# Patient Record
Sex: MALE | Race: WHITE | NOT HISPANIC OR LATINO | URBAN - METROPOLITAN AREA
[De-identification: names, ages, dates, MRNs, and addresses within clinical notes are randomized per-mention and may not be internally consistent; named-entity substitution may affect disease eponyms.]

---

## 2017-02-10 RX ORDER — LEVETIRACETAM 750 MG/1
TABLET ORAL
Qty: 270 TABLET | Refills: 0 | Status: SHIPPED | OUTPATIENT
Start: 2017-02-10

## 2017-02-10 NOTE — TELEPHONE ENCOUNTER
This patient continues to miss appointments but asked for refills.  A couple of months ago I recommended that he follow-up with Anthony Morocho at [facility]    I think Rhonda wrote a note stating that this would be okay      I see that he is back on my schedule.  I would like to have him taken off on my schedule because he is a frequent no-show, but somebody else and that slot, and have him follow-up with Anthony Morocho at the [facility]    I will fill this medicine today for the last time.  No further refills except through paula Morocho.  No further follow-up with me please.

## 2017-04-13 ENCOUNTER — TELEPHONE (OUTPATIENT)
Dept: NEUROLOGY | Facility: CLINIC | Age: 52
End: 2017-04-13

## 2017-04-13 NOTE — TELEPHONE ENCOUNTER
----- Message from Princess Wilde sent at 4/12/2017  4:04 PM EDT -----  I spoke with Mr. Burt. I informed him that he was being discharged from out practice due to frequent no shows. The patient has not been seen since 3/2014. I told him that Dr. Iyer would fill his dilantin and Keppra. HE was upset that we would not fill his controlled rx. I told him that he needs to contact his PCP to find another neurologist. The patient has called several times harassing the staff. HE called and asked to speak with Dr. Iyer. Dr. Iyer declined to speak with him. Dr. Iyer stated that he will fill his meds, but nothing controlled.   Pt is very upset and asked to speak with Risk.    Dr. Iyer will give pt. 45 day supply of his meds, but that is it. He needs to establish care with another provider.

## 2017-04-13 NOTE — TELEPHONE ENCOUNTER
Folling prescriptions were called into [facility]:    Keppra 750 mg TID #135  Lamictal 200 mg TID #135   Ativan .5 mg PRN #30